# Patient Record
Sex: FEMALE | Race: WHITE | Employment: UNEMPLOYED | URBAN - METROPOLITAN AREA
[De-identification: names, ages, dates, MRNs, and addresses within clinical notes are randomized per-mention and may not be internally consistent; named-entity substitution may affect disease eponyms.]

---

## 2023-03-28 ENCOUNTER — OFFICE VISIT (OUTPATIENT)
Dept: FAMILY MEDICINE CLINIC | Age: 58
End: 2023-03-28
Payer: COMMERCIAL

## 2023-03-28 ENCOUNTER — TELEPHONE (OUTPATIENT)
Dept: CARDIOLOGY CLINIC | Age: 58
End: 2023-03-28

## 2023-03-28 VITALS
HEIGHT: 66 IN | HEART RATE: 88 BPM | BODY MASS INDEX: 43.97 KG/M2 | WEIGHT: 273.6 LBS | RESPIRATION RATE: 16 BRPM | TEMPERATURE: 98.6 F | DIASTOLIC BLOOD PRESSURE: 102 MMHG | SYSTOLIC BLOOD PRESSURE: 172 MMHG

## 2023-03-28 DIAGNOSIS — I10 ESSENTIAL HYPERTENSION: Primary | ICD-10-CM

## 2023-03-28 DIAGNOSIS — I11.9 LVH (LEFT VENTRICULAR HYPERTROPHY) DUE TO HYPERTENSIVE DISEASE, WITHOUT HEART FAILURE: ICD-10-CM

## 2023-03-28 PROCEDURE — 3077F SYST BP >= 140 MM HG: CPT | Performed by: NURSE PRACTITIONER

## 2023-03-28 PROCEDURE — 99204 OFFICE O/P NEW MOD 45 MIN: CPT | Performed by: NURSE PRACTITIONER

## 2023-03-28 PROCEDURE — 93000 ELECTROCARDIOGRAM COMPLETE: CPT | Performed by: NURSE PRACTITIONER

## 2023-03-28 PROCEDURE — 3080F DIAST BP >= 90 MM HG: CPT | Performed by: NURSE PRACTITIONER

## 2023-03-28 RX ORDER — CARVEDILOL 3.12 MG/1
3.12 TABLET ORAL 2 TIMES DAILY
Qty: 60 TABLET | Refills: 0 | Status: SHIPPED | OUTPATIENT
Start: 2023-03-28

## 2023-03-28 RX ORDER — HYDROCHLOROTHIAZIDE 25 MG/1
25 TABLET ORAL DAILY
Qty: 30 TABLET | Refills: 0 | Status: SHIPPED | OUTPATIENT
Start: 2023-03-28

## 2023-03-28 RX ORDER — AMLODIPINE BESYLATE 10 MG/1
10 TABLET ORAL DAILY
Qty: 30 TABLET | Refills: 0 | Status: SHIPPED | OUTPATIENT
Start: 2023-03-28

## 2023-03-28 RX ORDER — LISINOPRIL 20 MG/1
40 TABLET ORAL DAILY
Qty: 60 TABLET | Refills: 0 | Status: SHIPPED | OUTPATIENT
Start: 2023-03-28 | End: 2023-04-27

## 2023-03-28 RX ORDER — HYDROCHLOROTHIAZIDE 25 MG/1
25 TABLET ORAL DAILY
COMMUNITY
Start: 2023-01-27 | End: 2023-03-28 | Stop reason: SDUPTHER

## 2023-03-28 SDOH — ECONOMIC STABILITY: FOOD INSECURITY: WITHIN THE PAST 12 MONTHS, THE FOOD YOU BOUGHT JUST DIDN'T LAST AND YOU DIDN'T HAVE MONEY TO GET MORE.: SOMETIMES TRUE

## 2023-03-28 SDOH — ECONOMIC STABILITY: FOOD INSECURITY: WITHIN THE PAST 12 MONTHS, YOU WORRIED THAT YOUR FOOD WOULD RUN OUT BEFORE YOU GOT MONEY TO BUY MORE.: SOMETIMES TRUE

## 2023-03-28 SDOH — ECONOMIC STABILITY: HOUSING INSECURITY
IN THE LAST 12 MONTHS, WAS THERE A TIME WHEN YOU DID NOT HAVE A STEADY PLACE TO SLEEP OR SLEPT IN A SHELTER (INCLUDING NOW)?: NO

## 2023-03-28 SDOH — ECONOMIC STABILITY: INCOME INSECURITY: HOW HARD IS IT FOR YOU TO PAY FOR THE VERY BASICS LIKE FOOD, HOUSING, MEDICAL CARE, AND HEATING?: SOMEWHAT HARD

## 2023-03-28 ASSESSMENT — PATIENT HEALTH QUESTIONNAIRE - PHQ9
SUM OF ALL RESPONSES TO PHQ QUESTIONS 1-9: 0
1. LITTLE INTEREST OR PLEASURE IN DOING THINGS: 0
SUM OF ALL RESPONSES TO PHQ QUESTIONS 1-9: 0
SUM OF ALL RESPONSES TO PHQ QUESTIONS 1-9: 0
SUM OF ALL RESPONSES TO PHQ9 QUESTIONS 1 & 2: 0
SUM OF ALL RESPONSES TO PHQ QUESTIONS 1-9: 0
2. FEELING DOWN, DEPRESSED OR HOPELESS: 0

## 2023-03-28 ASSESSMENT — ENCOUNTER SYMPTOMS
SHORTNESS OF BREATH: 0
COLOR CHANGE: 0
SORE THROAT: 0
ANAL BLEEDING: 0
RHINORRHEA: 0
BLOOD IN STOOL: 0
DIARRHEA: 0
EYE DISCHARGE: 0
ABDOMINAL PAIN: 0
CONSTIPATION: 0
NAUSEA: 0
ABDOMINAL DISTENTION: 0
COUGH: 0
EYE REDNESS: 0

## 2023-03-28 NOTE — PATIENT INSTRUCTIONS
Recommend ER for cardiac workup   Labs ordered  Stress test ordered  ECHO ordered  Referral to cardiology    Refills sent of blood pressure mediations  Add Lisinopril back in daily    Back in 4 weeks

## 2023-03-28 NOTE — PROGRESS NOTES
Hemoglobin A1C    Hepatitis C Antibody    HIV Screen    Tisha Carmona MD, Cardiology, [address] [place] [address]    Stress test, myoview    EKG 12 Lead    ECHO 2D WO Color Doppler Complete       Medications Prescribed:  Orders Placed This Encounter   Medications    carvedilol (COREG) 3.125 MG tablet     Sig: Take 1 tablet by mouth 2 times daily     Dispense:  60 tablet     Refill:  0    amLODIPine (NORVASC) 10 MG tablet     Sig: Take 1 tablet by mouth daily     Dispense:  30 tablet     Refill:  0    hydroCHLOROthiazide (HYDRODIURIL) 25 MG tablet     Sig: Take 1 tablet by mouth daily     Dispense:  30 tablet     Refill:  0    lisinopril (PRINIVIL) 20 MG tablet     Sig: Take 2 tablets by mouth daily     Dispense:  60 tablet     Refill:  0       Future Appointments   Date Time Provider Adamaris Leblanc   4/27/2023 10:20 AM ADRIANA Hua CNP [facility] - [address] [place] [address]      Patient given educational materials - see patient instructions. Discussed use, benefit, and side effects of prescribedmedications. All patient questions answered. Pt voiced understanding. Reviewed health maintenance. Instructed to continue current medications, diet and exercise. Patient agreed with treatment plan. Follow up as directed.     Electronically signed by ADRIANA Hua CNP on 3/28/2023 at 1:08 PM

## 2023-03-28 NOTE — TELEPHONE ENCOUNTER
New patient referral for Dr Trinidad Hinds. HTN (hypertension)   Essential hypertension   LVH (left ventricular hypertrophy) due to hypertensive disease, without heart failure     Call to patient, unable to LM.

## 2023-03-31 NOTE — TELEPHONE ENCOUNTER
Attempt to reach patient, no answer, voicemail not set up. Encounter routed to PCP office to make aware we have been unable to reach patient to schedule appt.

## 2023-04-04 LAB
ABSOLUTE BASO #: 0.06 K/UL (ref 0–0.2)
ABSOLUTE EOS #: 0.06 K/UL (ref 0–0.5)
ABSOLUTE LYMPH #: 1.95 K/UL (ref 1–4)
ABSOLUTE MONO #: 0.61 K/UL (ref 0.2–1)
ABSOLUTE NEUT #: 2.9 K/UL (ref 1.5–7.5)
BASOPHILS RELATIVE PERCENT: 1.1 %
EOSINOPHILS RELATIVE PERCENT: 1.1 %
HCT VFR BLD CALC: 47.7 % (ref 34–45)
HEMOGLOBIN: 16 G/DL (ref 11.5–15.5)
LYMPHOCYTE %: 34.9 %
MCH RBC QN AUTO: 29.1 PG (ref 25–33)
MCHC RBC AUTO-ENTMCNC: 33.5 G/DL (ref 31–36)
MCV RBC AUTO: 86.7 FL (ref 80–99)
MONOCYTES # BLD: 10.9 %
NEUTROPHILS RELATIVE PERCENT: 51.8 %
PDW BLD-RTO: 12.7 % (ref 11.5–15)
PLATELETS: 285 K/UL (ref 130–400)
PMV BLD AUTO: 9.7 FL (ref 9.3–13)
RBC: 5.5 M/UL (ref 3.8–5.4)
WBC: 5.6 K/UL (ref 3.5–11)

## 2023-04-05 LAB
ALBUMIN SERPL-MCNC: 5.1 G/DL (ref 3.5–5.2)
ALK PHOSPHATASE: 100 U/L (ref 40–136)
ALT SERPL-CCNC: 22 U/L (ref 5–40)
ANION GAP SERPL CALCULATED.3IONS-SCNC: 14 MEQ/L (ref 7–16)
AST SERPL-CCNC: 26 U/L (ref 9–40)
AVERAGE GLUCOSE: 114 MG/DL
BILIRUB SERPL-MCNC: 0.7 MG/DL
BILIRUBIN DIRECT: 0.2 MG/DL (ref 0–0.3)
BUN BLDV-MCNC: 12 MG/DL (ref 6–20)
CALCIUM SERPL-MCNC: 9.3 MG/DL (ref 8.5–10.5)
CHLORIDE BLD-SCNC: 99 MEQ/L (ref 95–107)
CHOLESTEROL/HDL RATIO: 3.5 RATIO
CHOLESTEROL: 190 MG/DL
CO2: 28 MEQ/L (ref 19–31)
CREAT SERPL-MCNC: 0.71 MG/DL (ref 0.6–1.3)
EGFR IF NONAFRICAN AMERICAN: 99 ML/MIN/1.73
GLUCOSE: 113 MG/DL (ref 70–99)
HBA1C MFR BLD: 5.6 % (ref 4.2–5.6)
HDLC SERPL-MCNC: 54 MG/DL
HEPATITIS C ANTIBODY: NORMAL
HIV 1,2 COMBO ANTIGEN/ANTIBODY: NORMAL
LDL CHOLESTEROL CALCULATED: 109 MG/DL
LDL/HDL RATIO: 2 RATIO
POTASSIUM SERPL-SCNC: 4.1 MEQ/L (ref 3.5–5.4)
SODIUM BLD-SCNC: 141 MEQ/L (ref 133–146)
TOTAL PROTEIN: 7.6 G/DL (ref 6.1–8.3)
TRIGL SERPL-MCNC: 134 MG/DL
TSH SERPL DL<=0.05 MIU/L-ACNC: 1.6 UIU/ML (ref 0.4–4.1)
VITAMIN D 25-HYDROXY: 36 NG/ML
VLDLC SERPL CALC-MCNC: 27 MG/DL

## 2023-04-11 ENCOUNTER — TELEPHONE (OUTPATIENT)
Dept: FAMILY MEDICINE CLINIC | Age: 58
End: 2023-04-11

## 2023-04-20 DIAGNOSIS — I10 ESSENTIAL HYPERTENSION: ICD-10-CM

## 2023-04-20 DIAGNOSIS — I11.9 LVH (LEFT VENTRICULAR HYPERTROPHY) DUE TO HYPERTENSIVE DISEASE, WITHOUT HEART FAILURE: ICD-10-CM

## 2023-04-20 RX ORDER — AMLODIPINE BESYLATE 10 MG/1
TABLET ORAL
Qty: 30 TABLET | Refills: 5 | Status: SHIPPED | OUTPATIENT
Start: 2023-04-20

## 2023-04-20 RX ORDER — LISINOPRIL 20 MG/1
TABLET ORAL
Qty: 60 TABLET | Refills: 5 | Status: SHIPPED | OUTPATIENT
Start: 2023-04-20

## 2023-04-20 RX ORDER — CARVEDILOL 3.12 MG/1
TABLET ORAL
Qty: 60 TABLET | Refills: 5 | Status: SHIPPED | OUTPATIENT
Start: 2023-04-20

## 2023-04-20 RX ORDER — HYDROCHLOROTHIAZIDE 25 MG/1
TABLET ORAL
Qty: 30 TABLET | Refills: 5 | Status: SHIPPED | OUTPATIENT
Start: 2023-04-20

## 2023-04-20 NOTE — TELEPHONE ENCOUNTER
Requests sent from [facility] for refills of amlodipine 10 mg qd, carvedilol 3.125 mg bid, lisinopril 20 mg, 2 tabs qd, and hctz 25 mg qd. Last seen 3/28/23, next appt 4/27/23. CMP last done 4/4/23. Medications verified. Orders pended.

## 2023-10-20 DIAGNOSIS — I11.9 LVH (LEFT VENTRICULAR HYPERTROPHY) DUE TO HYPERTENSIVE DISEASE, WITHOUT HEART FAILURE: ICD-10-CM

## 2023-10-20 DIAGNOSIS — I10 ESSENTIAL HYPERTENSION: ICD-10-CM

## 2023-10-20 NOTE — TELEPHONE ENCOUNTER
Requests sent from [facility] for refill of hctz 25 mg qd, lisinopril 20 mg qd, carvedilol 3.125 mg bid, and amlodipine 10 mg qd. Seen as new pt on 3/28/23, no future appt scheduled. Medications verified. Orders pended.

## 2023-10-23 RX ORDER — LISINOPRIL 20 MG/1
TABLET ORAL
Qty: 60 TABLET | Refills: 2 | Status: SHIPPED | OUTPATIENT
Start: 2023-10-23

## 2023-10-23 RX ORDER — AMLODIPINE BESYLATE 10 MG/1
TABLET ORAL
Qty: 30 TABLET | Refills: 2 | Status: SHIPPED | OUTPATIENT
Start: 2023-10-23

## 2023-10-23 RX ORDER — CARVEDILOL 3.12 MG/1
TABLET ORAL
Qty: 60 TABLET | Refills: 2 | Status: SHIPPED | OUTPATIENT
Start: 2023-10-23

## 2023-10-23 RX ORDER — HYDROCHLOROTHIAZIDE 25 MG/1
TABLET ORAL
Qty: 30 TABLET | Refills: 2 | Status: SHIPPED | OUTPATIENT
Start: 2023-10-23

## 2024-01-19 DIAGNOSIS — I11.9 LVH (LEFT VENTRICULAR HYPERTROPHY) DUE TO HYPERTENSIVE DISEASE, WITHOUT HEART FAILURE: ICD-10-CM

## 2024-01-19 DIAGNOSIS — I10 ESSENTIAL HYPERTENSION: ICD-10-CM

## 2024-01-19 NOTE — TELEPHONE ENCOUNTER
This medication refill is regarding a electronic request. Refill requested by  [facility] [address] .    Requested Prescriptions     Pending Prescriptions Disp Refills    amLODIPine (NORVASC) 10 MG tablet [Pharmacy Med Name: AMLODIPINE BESYLATE 10 MG TAB] 30 tablet 0     Sig: take 1 tablet by mouth once daily    carvedilol (COREG) 3.125 MG tablet [Pharmacy Med Name: CARVEDILOL 3.125 MG TABLET] 60 tablet 0     Sig: take 1 tablet by mouth twice a day    lisinopril (PRINIVIL;ZESTRIL) 20 MG tablet [Pharmacy Med Name: LISINOPRIL 20 MG TABLET] 60 tablet 0     Sig: take 2 tablets by mouth once daily    hydroCHLOROthiazide (HYDRODIURIL) 25 MG tablet [Pharmacy Med Name: HYDROCHLOROTHIAZIDE 25 MG TAB] 30 tablet 0     Sig: take 1 tablet by mouth once daily     Date of last visit: 3/28/2023   Date of next visit: None  Date of last refill:    -Amlodipine 10/23/23 #30/2   -Carvedilol 10/23/23 #60/2   -HCTZ 10/23/23 #30/2   -Lisinopril 10/23/23 #60/2    Last CMP:   Lab Results   Component Value Date     04/04/2023    K 4.1 04/04/2023    CL 99 04/04/2023    CO2 28 04/04/2023    BUN 12 04/04/2023    CREATININE 0.71 04/04/2023    GLUCOSE 113 (H) 04/04/2023    CALCIUM 9.3 04/04/2023    PROT 7.6 04/04/2023    LABALBU 5.1 04/04/2023    BILITOT 0.7 04/04/2023    ALKPHOS 100 04/04/2023    AST 26 04/04/2023    ALT 22 04/04/2023    LABGLOM >90 12/06/2011     Rx verified, ordered and set to EP.

## 2024-01-22 RX ORDER — AMLODIPINE BESYLATE 10 MG/1
TABLET ORAL
Qty: 30 TABLET | Refills: 0 | Status: SHIPPED | OUTPATIENT
Start: 2024-01-22

## 2024-01-22 RX ORDER — CARVEDILOL 3.12 MG/1
TABLET ORAL
Qty: 60 TABLET | Refills: 0 | Status: SHIPPED | OUTPATIENT
Start: 2024-01-22

## 2024-01-22 RX ORDER — LISINOPRIL 20 MG/1
TABLET ORAL
Qty: 60 TABLET | Refills: 0 | Status: SHIPPED | OUTPATIENT
Start: 2024-01-22

## 2024-01-22 RX ORDER — HYDROCHLOROTHIAZIDE 25 MG/1
TABLET ORAL
Qty: 30 TABLET | Refills: 0 | Status: SHIPPED | OUTPATIENT
Start: 2024-01-22

## 2024-01-22 NOTE — TELEPHONE ENCOUNTER
1st attempt:     Tried to call pt to let her know she is needing appt, but all circuits rang busy. Will try again at another time.

## 2024-02-18 DIAGNOSIS — I10 ESSENTIAL HYPERTENSION: ICD-10-CM

## 2024-02-18 DIAGNOSIS — I11.9 LVH (LEFT VENTRICULAR HYPERTROPHY) DUE TO HYPERTENSIVE DISEASE, WITHOUT HEART FAILURE: ICD-10-CM

## 2024-02-19 RX ORDER — HYDROCHLOROTHIAZIDE 25 MG/1
TABLET ORAL
Qty: 30 TABLET | Refills: 0 | OUTPATIENT
Start: 2024-02-19

## 2024-02-19 RX ORDER — CARVEDILOL 3.12 MG/1
TABLET ORAL
Qty: 60 TABLET | Refills: 0 | OUTPATIENT
Start: 2024-02-19

## 2024-02-19 RX ORDER — LISINOPRIL 20 MG/1
TABLET ORAL
Qty: 60 TABLET | Refills: 0 | OUTPATIENT
Start: 2024-02-19

## 2024-02-19 RX ORDER — AMLODIPINE BESYLATE 10 MG/1
TABLET ORAL
Qty: 30 TABLET | Refills: 0 | OUTPATIENT
Start: 2024-02-19

## 2024-02-19 NOTE — TELEPHONE ENCOUNTER
This medication refill is regarding a electronic request. Refill requested by  pharmacy .    Requested Prescriptions     Pending Prescriptions Disp Refills    amLODIPine (NORVASC) 10 MG tablet [Pharmacy Med Name: AMLODIPINE BESYLATE 10 MG TAB] 30 tablet 0     Sig: take 1 tablet by mouth once daily    carvedilol (COREG) 3.125 MG tablet [Pharmacy Med Name: CARVEDILOL 3.125 MG TABLET] 60 tablet 0     Sig: take 1 tablet by mouth twice a day    lisinopril (PRINIVIL;ZESTRIL) 20 MG tablet [Pharmacy Med Name: LISINOPRIL 20 MG TABLET] 60 tablet 0     Sig: take 2 tablets by mouth once daily    hydroCHLOROthiazide (HYDRODIURIL) 25 MG tablet [Pharmacy Med Name: HYDROCHLOROTHIAZIDE 25 MG TAB] 30 tablet 0     Sig: take 1 tablet by mouth once daily       Date of last visit: 3/28/2023   Date of next visit: Visit date not found  Date of last refill: 1/22/24  Pharmacy Name:     Last Lipid Panel:    Lab Results   Component Value Date/Time    CHOL 190 04/04/2023 11:45 AM    CHOL 200 12/06/2011 10:07 AM    TRIG 134 04/04/2023 11:45 AM    HDL 54 04/04/2023 11:45 AM    LDLCALC 109 04/04/2023 11:45 AM     Last CMP:   Lab Results   Component Value Date     04/04/2023    K 4.1 04/04/2023    CL 99 04/04/2023    CO2 28 04/04/2023    BUN 12 04/04/2023    CREATININE 0.71 04/04/2023    GLUCOSE 113 (H) 04/04/2023    CALCIUM 9.3 04/04/2023    PROT 7.6 04/04/2023    LABALBU 5.1 04/04/2023    BILITOT 0.7 04/04/2023    ALKPHOS 100 04/04/2023    AST 26 04/04/2023    ALT 22 04/04/2023    LABGLOM >90 12/06/2011       Last Thyroid:    Lab Results   Component Value Date    TSH 1.60 04/04/2023     Last Hemoglobin A1C:    Lab Results   Component Value Date/Time    LABA1C 5.6 04/04/2023 11:45 AM       Rx verified, ordered and set to EP.

## 2024-03-18 ENCOUNTER — OFFICE VISIT (OUTPATIENT)
Dept: FAMILY MEDICINE CLINIC | Age: 59
End: 2024-03-18
Payer: COMMERCIAL

## 2024-03-18 ENCOUNTER — TELEPHONE (OUTPATIENT)
Dept: CARDIOLOGY CLINIC | Age: 59
End: 2024-03-18

## 2024-03-18 VITALS
BODY MASS INDEX: 46.48 KG/M2 | WEIGHT: 288 LBS | SYSTOLIC BLOOD PRESSURE: 170 MMHG | HEART RATE: 88 BPM | DIASTOLIC BLOOD PRESSURE: 98 MMHG | RESPIRATION RATE: 16 BRPM

## 2024-03-18 DIAGNOSIS — I10 ESSENTIAL HYPERTENSION: Primary | ICD-10-CM

## 2024-03-18 DIAGNOSIS — Z00.00 ENCOUNTER FOR WELL ADULT EXAM WITHOUT ABNORMAL FINDINGS: ICD-10-CM

## 2024-03-18 DIAGNOSIS — Z12.11 COLON CANCER SCREENING: ICD-10-CM

## 2024-03-18 DIAGNOSIS — I11.9 LVH (LEFT VENTRICULAR HYPERTROPHY) DUE TO HYPERTENSIVE DISEASE, WITHOUT HEART FAILURE: ICD-10-CM

## 2024-03-18 DIAGNOSIS — Z71.89 ACP (ADVANCE CARE PLANNING): ICD-10-CM

## 2024-03-18 PROCEDURE — 3077F SYST BP >= 140 MM HG: CPT | Performed by: NURSE PRACTITIONER

## 2024-03-18 PROCEDURE — 99396 PREV VISIT EST AGE 40-64: CPT | Performed by: NURSE PRACTITIONER

## 2024-03-18 PROCEDURE — 3080F DIAST BP >= 90 MM HG: CPT | Performed by: NURSE PRACTITIONER

## 2024-03-18 RX ORDER — LISINOPRIL 20 MG/1
40 TABLET ORAL DAILY
Qty: 60 TABLET | Refills: 0 | Status: SHIPPED | OUTPATIENT
Start: 2024-03-18

## 2024-03-18 RX ORDER — CARVEDILOL 3.12 MG/1
3.12 TABLET ORAL 2 TIMES DAILY
Qty: 60 TABLET | Refills: 0 | Status: SHIPPED | OUTPATIENT
Start: 2024-03-18

## 2024-03-18 RX ORDER — HYDROCHLOROTHIAZIDE 25 MG/1
25 TABLET ORAL DAILY
Qty: 30 TABLET | Refills: 0 | Status: SHIPPED | OUTPATIENT
Start: 2024-03-18

## 2024-03-18 RX ORDER — AMLODIPINE BESYLATE 10 MG/1
10 TABLET ORAL DAILY
Qty: 30 TABLET | Refills: 0 | Status: SHIPPED | OUTPATIENT
Start: 2024-03-18

## 2024-03-18 ASSESSMENT — ENCOUNTER SYMPTOMS
DIARRHEA: 0
SORE THROAT: 0
NAUSEA: 0
BLOOD IN STOOL: 0
COUGH: 0
SHORTNESS OF BREATH: 0
ABDOMINAL PAIN: 0
RHINORRHEA: 0
ANAL BLEEDING: 0
COLOR CHANGE: 0
ABDOMINAL DISTENTION: 0
EYE REDNESS: 0
EYE DISCHARGE: 0

## 2024-03-18 NOTE — PATIENT INSTRUCTIONS
Incorporated.   Care instructions adapted under license by [facility]. If you have questions about a medical condition or this instruction, always ask your healthcare professional. [employer] disclaims any warranty or liability for your use of this information.

## 2024-03-18 NOTE — TELEPHONE ENCOUNTER
LM for pt to return call.    Pt is needing a NP appt.  Diagnosis   I10 (ICD-10-CM) - Essential hypertension   I11.9 (ICD-10-CM) - LVH (left ventricular hypertrophy) due to hypertensive disease, without heart failure

## 2024-03-18 NOTE — PROGRESS NOTES
Well Adult Note  Name: Kala Gomez Today’s Date: 3/18/2024   MRN: [id number] Sex: Female   Age: 58 y.o. Ethnicity: Non- / Non    : 1965 Race: White (non-)      Kala Gomez is here for well adult exam.  History:    Has not been in office in a year, since new pt appointment.   At that time she was referred to cardiology for abnormal EKG and elevated blood pressure.  We have also ordered a stress test and echo.  None of which was completed.  Cardiology department attempted to contact her multiple times with no answer or return call.    Came in today for refill of her blood pressure medication, states she has been out \"for a few days\".    Denies chest pain or shortness of breath today.    Wellness visit:    Here today for an annual wellness exam. DIET: eats 3 meals per day and 2 snacks per day.  She does exercise regularly. Physical activities include: walking. She does take over the counter vitamins or supplements.  The patient has ever had a blood transfusion No. Any tattoos No ?    She wears her seatbelts while riding a car. She performs all of her ADL's without problem.  She is independent, she cooks, drives, bathes, and gets dressed without assistance. She is a . She has 4 children.  She does not work.     She is not a smoker. Smokes 0 packs per day.  Patient 0 consume alcoholic beverages on a regular basis.      Never had colonoscopy - refuses.  Her last mammogram - NEVER - refuses.  Her last pap smear was  and it was normal - refuses referral/repeat testing.     She  reports that she has never smoked. She has never used smokeless tobacco. She reports that she does not drink alcohol and does not use drugs..       Review of Systems   Constitutional:  Negative for chills, fatigue and fever.   HENT:  Negative for congestion, ear pain, postnasal drip, rhinorrhea and sore throat.    Eyes:  Negative for discharge and redness.   Respiratory:  Negative for cough and shortness of

## 2024-03-21 NOTE — TELEPHONE ENCOUNTER
Please call patient regarding cardiology referral - provide her with the phone number to schedule, they have attempted to contact her multiple times

## 2024-04-09 DIAGNOSIS — I10 ESSENTIAL HYPERTENSION: ICD-10-CM

## 2024-04-09 DIAGNOSIS — I11.9 LVH (LEFT VENTRICULAR HYPERTROPHY) DUE TO HYPERTENSIVE DISEASE, WITHOUT HEART FAILURE: ICD-10-CM

## 2024-04-09 RX ORDER — LISINOPRIL 20 MG/1
40 TABLET ORAL DAILY
Qty: 60 TABLET | Refills: 0 | Status: SHIPPED | OUTPATIENT
Start: 2024-04-09

## 2024-04-09 RX ORDER — AMLODIPINE BESYLATE 10 MG/1
10 TABLET ORAL DAILY
Qty: 30 TABLET | Refills: 0 | Status: SHIPPED | OUTPATIENT
Start: 2024-04-09

## 2024-04-09 RX ORDER — CARVEDILOL 3.12 MG/1
3.12 TABLET ORAL 2 TIMES DAILY
Qty: 60 TABLET | Refills: 0 | Status: SHIPPED | OUTPATIENT
Start: 2024-04-09

## 2024-04-09 RX ORDER — HYDROCHLOROTHIAZIDE 25 MG/1
25 TABLET ORAL DAILY
Qty: 30 TABLET | Refills: 0 | Status: SHIPPED | OUTPATIENT
Start: 2024-04-09

## 2024-04-09 NOTE — TELEPHONE ENCOUNTER
This medication refill is regarding a electronic request. Refill requested by  PHARMACY .    Requested Prescriptions     Pending Prescriptions Disp Refills    amLODIPine (NORVASC) 10 MG tablet [Pharmacy Med Name: AMLODIPINE BESYLATE 10 MG TAB] 30 tablet 0     Sig: take 1 tablet by mouth once daily    carvedilol (COREG) 3.125 MG tablet [Pharmacy Med Name: CARVEDILOL 3.125 MG TABLET] 60 tablet 0     Sig: take 1 tablet by mouth twice a day    lisinopril (PRINIVIL;ZESTRIL) 20 MG tablet [Pharmacy Med Name: LISINOPRIL 20 MG TABLET] 60 tablet 0     Sig: take 2 tablets by mouth once daily    hydroCHLOROthiazide (HYDRODIURIL) 25 MG tablet [Pharmacy Med Name: HYDROCHLOROTHIAZIDE 25 MG TAB] 30 tablet 0     Sig: take 1 tablet by mouth once daily       Date of last visit: 3/18/2024   Date of next visit: 4/15/2024  Date of last refill: 3/18/24  Pharmacy Name:     Last Lipid Panel:    Lab Results   Component Value Date/Time    CHOL 190 04/04/2023 11:45 AM    CHOL 200 12/06/2011 10:07 AM    TRIG 134 04/04/2023 11:45 AM    HDL 54 04/04/2023 11:45 AM    LDLCALC 109 04/04/2023 11:45 AM     Last CMP:   Lab Results   Component Value Date     04/04/2023    K 4.1 04/04/2023    CL 99 04/04/2023    CO2 28 04/04/2023    BUN 12 04/04/2023    CREATININE 0.71 04/04/2023    GLUCOSE 113 (H) 04/04/2023    CALCIUM 9.3 04/04/2023    PROT 7.6 04/04/2023    LABALBU 5.1 04/04/2023    BILITOT 0.7 04/04/2023    ALKPHOS 100 04/04/2023    AST 26 04/04/2023    ALT 22 04/04/2023    LABGLOM >90 12/06/2011       Last Thyroid:    Lab Results   Component Value Date    TSH 1.60 04/04/2023     Last Hemoglobin A1C:    Lab Results   Component Value Date/Time    LABA1C 5.6 04/04/2023 11:45 AM       Rx verified, ordered and set to EP.

## 2024-04-11 LAB — NONINV COLON CA DNA+OCC BLD SCRN STL QL: NORMAL

## 2024-04-12 ENCOUNTER — TELEPHONE (OUTPATIENT)
Dept: FAMILY MEDICINE CLINIC | Age: 59
End: 2024-04-12

## 2024-04-12 LAB
ABSOLUTE BASO #: 0.05 K/UL (ref 0–0.2)
ABSOLUTE EOS #: 0.04 K/UL (ref 0–0.5)
ABSOLUTE LYMPH #: 1.29 K/UL (ref 1–4)
ABSOLUTE MONO #: 0.52 K/UL (ref 0.2–1)
ABSOLUTE NEUT #: 3.32 K/UL (ref 1.5–7.5)
ALBUMIN SERPL-MCNC: 4.3 G/DL (ref 3.5–5.2)
ALK PHOSPHATASE: 100 U/L (ref 40–136)
ALT SERPL-CCNC: 19 U/L (ref 5–40)
ANION GAP SERPL CALCULATED.3IONS-SCNC: 8 MEQ/L (ref 7–16)
AST SERPL-CCNC: 20 U/L (ref 9–40)
BASOPHILS RELATIVE PERCENT: 1 %
BILIRUB SERPL-MCNC: 0.8 MG/DL
BUN BLDV-MCNC: 10 MG/DL (ref 6–20)
CALCIUM SERPL-MCNC: 9.4 MG/DL (ref 8.5–10.5)
CHLORIDE BLD-SCNC: 101 MEQ/L (ref 95–107)
CHOLESTEROL/HDL RATIO: 4.9 RATIO
CHOLESTEROL: 210 MG/DL
CO2: 30 MEQ/L (ref 19–31)
CREAT SERPL-MCNC: 0.75 MG/DL (ref 0.6–1.3)
EGFR IF NONAFRICAN AMERICAN: 92 ML/MIN/1.73
EOSINOPHILS RELATIVE PERCENT: 0.8 %
GLUCOSE: 97 MG/DL (ref 70–99)
HCT VFR BLD CALC: 45.3 % (ref 34–45)
HDLC SERPL-MCNC: 43 MG/DL
HEMOGLOBIN: 14.6 G/DL (ref 11.5–15.5)
IMMATURE GRANULOCYTES %: 0.2 %
LDL CHOLESTEROL CALCULATED: 134 MG/DL
LDL/HDL RATIO: 3.1 RATIO
LYMPHOCYTE %: 24.7 %
MCH RBC QN AUTO: 28.7 PG (ref 25–33)
MCHC RBC AUTO-ENTMCNC: 32.2 G/DL (ref 31–36)
MCV RBC AUTO: 89 FL (ref 80–99)
MONOCYTES # BLD: 9.9 %
NEUTROPHILS RELATIVE PERCENT: 63.4 %
PDW BLD-RTO: 12.6 % (ref 11.5–15)
PLATELETS: 268 K/UL (ref 130–400)
PMV BLD AUTO: 9.7 FL (ref 9.3–13)
POTASSIUM SERPL-SCNC: 4.2 MEQ/L (ref 3.5–5.4)
RBC: 5.09 M/UL (ref 3.8–5.4)
SODIUM BLD-SCNC: 139 MEQ/L (ref 133–146)
TOTAL PROTEIN: 6.7 G/DL (ref 6.1–8.3)
TRIGL SERPL-MCNC: 164 MG/DL
TSH SERPL DL<=0.05 MIU/L-ACNC: 1.53 UIU/ML (ref 0.4–4.1)
VITAMIN D 25-HYDROXY: 31 NG/ML
VLDLC SERPL CALC-MCNC: 33 MG/DL
WBC: 5.2 K/UL (ref 3.5–11)

## 2024-04-12 NOTE — TELEPHONE ENCOUNTER
----- Message from ADRIANA Staples - CNP sent at 4/12/2024 10:15 AM EDT -----  Cholesterol is elevated.  Recommend starting Lipitor 20 mg.  1 tablet daily.  Dispense 30 with 5 refills.  Recheck lipid panel in 3 months with a 12-hour fast.  DX mixed hyperlipidemia    Vitamin D is low at 31.  Recommend starting 5000 IUs of vitamin D3, take 1 capsule daily.  Can get this over-the-counter    Please ensure patient has been taking her blood pressure medication since her last visit and encouraged her to keep her follow-up on the 15th.

## 2024-04-15 ENCOUNTER — OFFICE VISIT (OUTPATIENT)
Dept: FAMILY MEDICINE CLINIC | Age: 59
End: 2024-04-15
Payer: COMMERCIAL

## 2024-04-15 VITALS
SYSTOLIC BLOOD PRESSURE: 162 MMHG | WEIGHT: 290 LBS | HEART RATE: 92 BPM | RESPIRATION RATE: 16 BRPM | DIASTOLIC BLOOD PRESSURE: 98 MMHG | BODY MASS INDEX: 46.61 KG/M2 | HEIGHT: 66 IN

## 2024-04-15 DIAGNOSIS — E78.2 MIXED HYPERLIPIDEMIA: Primary | ICD-10-CM

## 2024-04-15 DIAGNOSIS — I10 ESSENTIAL HYPERTENSION: ICD-10-CM

## 2024-04-15 DIAGNOSIS — I11.9 LVH (LEFT VENTRICULAR HYPERTROPHY) DUE TO HYPERTENSIVE DISEASE, WITHOUT HEART FAILURE: ICD-10-CM

## 2024-04-15 PROCEDURE — 99214 OFFICE O/P EST MOD 30 MIN: CPT | Performed by: NURSE PRACTITIONER

## 2024-04-15 PROCEDURE — 3077F SYST BP >= 140 MM HG: CPT | Performed by: NURSE PRACTITIONER

## 2024-04-15 PROCEDURE — 3080F DIAST BP >= 90 MM HG: CPT | Performed by: NURSE PRACTITIONER

## 2024-04-15 RX ORDER — ATORVASTATIN CALCIUM 20 MG/1
20 TABLET, FILM COATED ORAL DAILY
Qty: 30 TABLET | Refills: 3 | Status: SHIPPED | OUTPATIENT
Start: 2024-04-15

## 2024-04-15 RX ORDER — ASPIRIN 81 MG/1
81 TABLET ORAL DAILY
Qty: 90 TABLET | Refills: 0 | Status: SHIPPED | OUTPATIENT
Start: 2024-04-15

## 2024-04-15 RX ORDER — CARVEDILOL 6.25 MG/1
6.25 TABLET ORAL 2 TIMES DAILY
Qty: 60 TABLET | Refills: 0 | Status: SHIPPED | OUTPATIENT
Start: 2024-04-15

## 2024-04-15 ASSESSMENT — ENCOUNTER SYMPTOMS
EYE DISCHARGE: 0
BLOOD IN STOOL: 0
NAUSEA: 0
ANAL BLEEDING: 0
RHINORRHEA: 0
ABDOMINAL PAIN: 0
COUGH: 0
EYE REDNESS: 0
COLOR CHANGE: 0
ABDOMINAL DISTENTION: 0
SHORTNESS OF BREATH: 0
CONSTIPATION: 0
DIARRHEA: 0
SORE THROAT: 0

## 2024-04-15 ASSESSMENT — PATIENT HEALTH QUESTIONNAIRE - PHQ9
SUM OF ALL RESPONSES TO PHQ QUESTIONS 1-9: 0
2. FEELING DOWN, DEPRESSED OR HOPELESS: NOT AT ALL
SUM OF ALL RESPONSES TO PHQ QUESTIONS 1-9: 0
1. LITTLE INTEREST OR PLEASURE IN DOING THINGS: NOT AT ALL
SUM OF ALL RESPONSES TO PHQ QUESTIONS 1-9: 0
SUM OF ALL RESPONSES TO PHQ QUESTIONS 1-9: 0
SUM OF ALL RESPONSES TO PHQ9 QUESTIONS 1 & 2: 0

## 2024-04-15 NOTE — PROGRESS NOTES
[facility]  [facility] MEDICINE  [address]  [facility] [address]  Dept: [phone]  Loc: [phone]    Visit Date: 4/15/2024    Kala Gomez is a 58 y.o. female who presents today for:  Chief Complaint   Patient presents with    1 Month Follow-Up     HPI:     Never saw cardiologist -has been referred twice.  Cardiology attempted to reach patient for scheduling, no answer.  Ordered a stress test and echo for patient, was never completed.  Patient reports that she checks her blood pressure occasionally when she goes to [employer].  Blood pressure is always high, 180s over 90s to 100s    Colon Cancer - Cologuard:  FIT-DNA (Cologuard)  N/A Sample Could Not Be Processed 2   Comment: Addition of stabilization buffer to the specimen could not be verified. The patient will be contacted to initiate a new sample collection.     Recent labs reviewed - cholesterol elevated   HPI  Health Maintenance   Topic Date Due    COVID-19 Vaccine (1) Never done    DTaP/Tdap/Td vaccine (1 - Tdap) Never done    Colorectal Cancer Screen  Never done    Breast cancer screen  05/05/2017    Depression Screen  03/28/2024    Shingles vaccine (1 of 2) 03/18/2025 (Originally 9/19/2015)    Cervical cancer screen  03/18/2025 (Originally 9/19/1986)    Lipids  04/11/2029    Hepatitis B vaccine  Completed    Flu vaccine  Completed    Hepatitis C screen  Completed    HIV screen  Completed    Hepatitis A vaccine  Aged Out    Hib vaccine  Aged Out    Polio vaccine  Aged Out    Meningococcal (ACWY) vaccine  Aged Out    Pneumococcal 0-64 years Vaccine  Aged Out    Diabetes screen  Discontinued     History reviewed. No pertinent past medical history.   History reviewed. No pertinent surgical history.  Family History   Problem Relation Age of Onset    Arthritis Mother     High Blood Pressure Mother     High Blood Pressure Father      Social History     Tobacco Use    Smoking status: Never    Smokeless tobacco: Never

## 2024-05-08 ENCOUNTER — HOSPITAL ENCOUNTER (OUTPATIENT)
Age: 59
Discharge: HOME | End: 2024-05-10
Payer: COMMERCIAL

## 2024-05-08 VITALS
BODY MASS INDEX: 46.61 KG/M2 | SYSTOLIC BLOOD PRESSURE: 162 MMHG | WEIGHT: 290 LBS | HEIGHT: 66 IN | DIASTOLIC BLOOD PRESSURE: 98 MMHG

## 2024-05-08 VITALS — SYSTOLIC BLOOD PRESSURE: 239 MMHG | RESPIRATION RATE: 21 BRPM | HEART RATE: 96 BPM | DIASTOLIC BLOOD PRESSURE: 121 MMHG

## 2024-05-08 DIAGNOSIS — I11.9 LVH (LEFT VENTRICULAR HYPERTROPHY) DUE TO HYPERTENSIVE DISEASE, WITHOUT HEART FAILURE: ICD-10-CM

## 2024-05-08 DIAGNOSIS — I10 ESSENTIAL HYPERTENSION: ICD-10-CM

## 2024-05-08 LAB
ECHO AO ASC DIAM: 3.8 CM
ECHO AO ASCENDING AORTA INDEX: 1.62 CM/M2
ECHO AV CUSP MM: 1.9 CM
ECHO AV PEAK GRADIENT: 9 MMHG
ECHO AV PEAK VELOCITY: 1.5 M/S
ECHO AV VELOCITY RATIO: 0.67
ECHO BSA: 2.47 M2
ECHO EST RA PRESSURE: 5 MMHG
ECHO LA AREA 2C: 18.5 CM2
ECHO LA AREA 4C: 17.8 CM2
ECHO LA DIAMETER INDEX: 1.79 CM/M2
ECHO LA DIAMETER: 4.2 CM
ECHO LA MAJOR AXIS: 5.4 CM
ECHO LA MINOR AXIS: 5.8 CM
ECHO LA VOL BP: 50 ML (ref 22–52)
ECHO LA VOL MOD A2C: 49 ML (ref 22–52)
ECHO LA VOL MOD A4C: 47 ML (ref 22–52)
ECHO LA VOL/BSA BIPLANE: 21 ML/M2 (ref 16–34)
ECHO LA VOLUME INDEX MOD A2C: 21 ML/M2 (ref 16–34)
ECHO LA VOLUME INDEX MOD A4C: 20 ML/M2 (ref 16–34)
ECHO LV E' LATERAL VELOCITY: 7 CM/S
ECHO LV E' SEPTAL VELOCITY: 7 CM/S
ECHO LV EDV A2C: 122 ML
ECHO LV EDV A4C: 136 ML
ECHO LV EDV INDEX A4C: 58 ML/M2
ECHO LV EDV NDEX A2C: 52 ML/M2
ECHO LV EJECTION FRACTION A2C: 41 %
ECHO LV EJECTION FRACTION A4C: 41 %
ECHO LV EJECTION FRACTION BIPLANE: 41 % (ref 55–100)
ECHO LV ESV A2C: 73 ML
ECHO LV ESV A4C: 81 ML
ECHO LV ESV INDEX A2C: 31 ML/M2
ECHO LV ESV INDEX A4C: 35 ML/M2
ECHO LV FRACTIONAL SHORTENING: 20 % (ref 28–44)
ECHO LV INTERNAL DIMENSION DIASTOLE INDEX: 2.35 CM/M2
ECHO LV INTERNAL DIMENSION DIASTOLIC: 5.5 CM (ref 3.9–5.3)
ECHO LV INTERNAL DIMENSION SYSTOLIC INDEX: 1.88 CM/M2
ECHO LV INTERNAL DIMENSION SYSTOLIC: 4.4 CM
ECHO LV ISOVOLUMETRIC RELAXATION TIME (IVRT): 98 MS
ECHO LV IVSD: 1.1 CM (ref 0.6–0.9)
ECHO LV MASS 2D: 242 G (ref 67–162)
ECHO LV MASS INDEX 2D: 103.4 G/M2 (ref 43–95)
ECHO LV POSTERIOR WALL DIASTOLIC: 1.1 CM (ref 0.6–0.9)
ECHO LV RELATIVE WALL THICKNESS RATIO: 0.4
ECHO LVOT PEAK GRADIENT: 4 MMHG
ECHO LVOT PEAK VELOCITY: 1 M/S
ECHO MV A VELOCITY: 1.02 M/S
ECHO MV E DECELERATION TIME (DT): 83 MS
ECHO MV E VELOCITY: 0.78 M/S
ECHO MV E/A RATIO: 0.76
ECHO MV E/E' LATERAL: 11.14
ECHO MV E/E' RATIO (AVERAGED): 11.14
ECHO MV REGURGITANT PEAK GRADIENT: 159 MMHG
ECHO MV REGURGITANT PEAK VELOCITY: 6.3 M/S
ECHO PV MAX VELOCITY: 0.8 M/S
ECHO PV PEAK GRADIENT: 2 MMHG
ECHO RV INTERNAL DIMENSION: 2.9 CM
ECHO RV TAPSE: 1.9 CM (ref 1.7–?)
ECHO TV E WAVE: 0.6 M/S

## 2024-05-08 PROCEDURE — 93306 TTE W/DOPPLER COMPLETE: CPT | Performed by: INTERNAL MEDICINE

## 2024-05-08 PROCEDURE — 93306 TTE W/DOPPLER COMPLETE: CPT

## 2024-05-08 PROCEDURE — 93017 CV STRESS TEST TRACING ONLY: CPT

## 2024-05-08 NOTE — PROGRESS NOTES
1005: Patient received from cardiac ECHO lab for scheduled exercise stress test. Two patient identifiers verified.     1010: Patient educated about procedure, consent signed.     1015: Patient hooked up to continuous 12 lead EKG for stress test. Vitals taken. Patient with severely elevated blood pressure. Patient denies any symptoms. Blood pressure retaken multiple times to ensure accuracy.     1030: Dr. Porter called and updated on patient status. Instructed this RN to cancel procedure due to severe hypertension and to bring patient to emergency room.     1032: Patient updated on phone call. Instructed patient that the Cardiologist wishes for her to be seen in the emergency room. Patient not agreeable to be taken to emergency room, wishes to go home. Patient educated on the risks of leaving the hospital with severely elevated blood pressure. Patient still addiment on going home. Patient taken by wheelchair to hospital exit doors where she walked to her vehicle.

## 2024-05-11 DIAGNOSIS — I11.9 LVH (LEFT VENTRICULAR HYPERTROPHY) DUE TO HYPERTENSIVE DISEASE, WITHOUT HEART FAILURE: ICD-10-CM

## 2024-05-11 DIAGNOSIS — I10 ESSENTIAL HYPERTENSION: ICD-10-CM

## 2024-05-13 ENCOUNTER — TELEPHONE (OUTPATIENT)
Dept: FAMILY MEDICINE CLINIC | Age: 59
End: 2024-05-13

## 2024-05-13 ENCOUNTER — OFFICE VISIT (OUTPATIENT)
Dept: FAMILY MEDICINE CLINIC | Age: 59
End: 2024-05-13
Payer: COMMERCIAL

## 2024-05-13 VITALS
BODY MASS INDEX: 46.77 KG/M2 | RESPIRATION RATE: 16 BRPM | HEART RATE: 72 BPM | TEMPERATURE: 97.8 F | WEIGHT: 289.8 LBS | SYSTOLIC BLOOD PRESSURE: 194 MMHG | DIASTOLIC BLOOD PRESSURE: 110 MMHG

## 2024-05-13 DIAGNOSIS — I10 ESSENTIAL HYPERTENSION: ICD-10-CM

## 2024-05-13 DIAGNOSIS — I11.9 LVH (LEFT VENTRICULAR HYPERTROPHY) DUE TO HYPERTENSIVE DISEASE, WITHOUT HEART FAILURE: ICD-10-CM

## 2024-05-13 DIAGNOSIS — E78.2 MIXED HYPERLIPIDEMIA: ICD-10-CM

## 2024-05-13 PROCEDURE — 3080F DIAST BP >= 90 MM HG: CPT | Performed by: NURSE PRACTITIONER

## 2024-05-13 PROCEDURE — 3077F SYST BP >= 140 MM HG: CPT | Performed by: NURSE PRACTITIONER

## 2024-05-13 PROCEDURE — 99214 OFFICE O/P EST MOD 30 MIN: CPT | Performed by: NURSE PRACTITIONER

## 2024-05-13 RX ORDER — CARVEDILOL 6.25 MG/1
6.25 TABLET ORAL 2 TIMES DAILY
Qty: 60 TABLET | Refills: 0 | OUTPATIENT
Start: 2024-05-13

## 2024-05-13 RX ORDER — CARVEDILOL 6.25 MG/1
6.25 TABLET ORAL 2 TIMES DAILY
Qty: 60 TABLET | Refills: 0 | Status: SHIPPED | OUTPATIENT
Start: 2024-05-13

## 2024-05-13 RX ORDER — AMLODIPINE BESYLATE 10 MG/1
10 TABLET ORAL DAILY
Qty: 30 TABLET | Refills: 1 | Status: SHIPPED | OUTPATIENT
Start: 2024-05-13

## 2024-05-13 RX ORDER — LISINOPRIL 20 MG/1
40 TABLET ORAL DAILY
Qty: 60 TABLET | Refills: 0 | Status: SHIPPED | OUTPATIENT
Start: 2024-05-13

## 2024-05-13 RX ORDER — HYDROCHLOROTHIAZIDE 25 MG/1
25 TABLET ORAL DAILY
Qty: 30 TABLET | Refills: 0 | Status: SHIPPED | OUTPATIENT
Start: 2024-05-13

## 2024-05-13 RX ORDER — ATORVASTATIN CALCIUM 20 MG/1
20 TABLET, FILM COATED ORAL DAILY
Qty: 30 TABLET | Refills: 0 | Status: SHIPPED | OUTPATIENT
Start: 2024-05-13

## 2024-05-13 RX ORDER — ASPIRIN 81 MG/1
81 TABLET ORAL DAILY
Qty: 90 TABLET | Refills: 0 | Status: SHIPPED | OUTPATIENT
Start: 2024-05-13

## 2024-05-13 SDOH — ECONOMIC STABILITY: FOOD INSECURITY: WITHIN THE PAST 12 MONTHS, THE FOOD YOU BOUGHT JUST DIDN'T LAST AND YOU DIDN'T HAVE MONEY TO GET MORE.: NEVER TRUE

## 2024-05-13 SDOH — ECONOMIC STABILITY: INCOME INSECURITY: HOW HARD IS IT FOR YOU TO PAY FOR THE VERY BASICS LIKE FOOD, HOUSING, MEDICAL CARE, AND HEATING?: NOT HARD AT ALL

## 2024-05-13 SDOH — ECONOMIC STABILITY: FOOD INSECURITY: WITHIN THE PAST 12 MONTHS, YOU WORRIED THAT YOUR FOOD WOULD RUN OUT BEFORE YOU GOT MONEY TO BUY MORE.: NEVER TRUE

## 2024-05-13 ASSESSMENT — ENCOUNTER SYMPTOMS
SHORTNESS OF BREATH: 0
SORE THROAT: 0
ABDOMINAL DISTENTION: 0
COLOR CHANGE: 0
COUGH: 0
NAUSEA: 0
RHINORRHEA: 0
BLOOD IN STOOL: 0
CONSTIPATION: 0
EYE DISCHARGE: 0

## 2024-05-13 NOTE — TELEPHONE ENCOUNTER
Kala stopped in stating Dr. Terry see's both her daughter and grand daughter and would like to become a new patient with Dr. Terry herself?

## 2024-05-13 NOTE — TELEPHONE ENCOUNTER
I called Kala to schedule her as a new patient with Dr. Terry, no answer left a detailed VM requesting Kala to call the office back and schedule!

## 2024-05-13 NOTE — TELEPHONE ENCOUNTER
----- Message from ADRIANA Staples - CNP sent at 5/8/2024  3:55 PM EDT -----  ECHO is abnormal - try to move up Cardiology appointment please.    Call patient regarding recent blood pressure - scheduled for stress test today and bp was 239/121 - they advised she go to ER but she declined. Please call patient and advise again to go to the ER for blood pressure. High risk of heart attack/stroke.

## 2024-05-13 NOTE — TELEPHONE ENCOUNTER
Called pt 5/9/52 and left msg to call office back, she is in office 5/13/24 and discussed results at appointment.

## 2024-05-13 NOTE — TELEPHONE ENCOUNTER
[facility] carvedilol 6.25 mg bid.  Last seen 4/15/24, being seen again today.  Will have provider address at [facility].

## 2024-05-13 NOTE — PROGRESS NOTES
questions answered.  Pt voiced understanding. Reviewed health maintenance.  Instructed to continue current medications, diet and exercise.  Patient agreed with treatment plan. Follow up as directed.    Electronically signed by ADRIANA Staples CNP on 5/13/2024 at 1:16 PM

## 2024-05-28 ENCOUNTER — OFFICE VISIT (OUTPATIENT)
Dept: FAMILY MEDICINE CLINIC | Age: 59
End: 2024-05-28
Payer: COMMERCIAL

## 2024-05-28 VITALS
RESPIRATION RATE: 14 BRPM | OXYGEN SATURATION: 99 % | TEMPERATURE: 98.6 F | DIASTOLIC BLOOD PRESSURE: 92 MMHG | BODY MASS INDEX: 46.09 KG/M2 | HEIGHT: 66 IN | SYSTOLIC BLOOD PRESSURE: 142 MMHG | WEIGHT: 286.8 LBS | HEART RATE: 85 BPM

## 2024-05-28 DIAGNOSIS — I10 PRIMARY HYPERTENSION: Primary | ICD-10-CM

## 2024-05-28 DIAGNOSIS — Z12.31 ENCOUNTER FOR SCREENING MAMMOGRAM FOR MALIGNANT NEOPLASM OF BREAST: ICD-10-CM

## 2024-05-28 DIAGNOSIS — E78.49 OTHER HYPERLIPIDEMIA: ICD-10-CM

## 2024-05-28 PROCEDURE — 3077F SYST BP >= 140 MM HG: CPT | Performed by: FAMILY MEDICINE

## 2024-05-28 PROCEDURE — 3080F DIAST BP >= 90 MM HG: CPT | Performed by: FAMILY MEDICINE

## 2024-05-28 PROCEDURE — 99204 OFFICE O/P NEW MOD 45 MIN: CPT | Performed by: FAMILY MEDICINE

## 2024-05-28 RX ORDER — LISINOPRIL 40 MG/1
40 TABLET ORAL DAILY
Qty: 90 TABLET | Refills: 1 | Status: SHIPPED | OUTPATIENT
Start: 2024-05-28

## 2024-05-28 RX ORDER — CARVEDILOL 6.25 MG/1
6.25 TABLET ORAL 2 TIMES DAILY
Qty: 180 TABLET | Refills: 1 | Status: SHIPPED | OUTPATIENT
Start: 2024-05-28

## 2024-05-28 RX ORDER — ATORVASTATIN CALCIUM 20 MG/1
20 TABLET, FILM COATED ORAL DAILY
Qty: 90 TABLET | Refills: 1 | Status: SHIPPED | OUTPATIENT
Start: 2024-05-28

## 2024-05-28 RX ORDER — HYDROCHLOROTHIAZIDE 25 MG/1
25 TABLET ORAL DAILY
Qty: 90 TABLET | Refills: 1 | Status: SHIPPED | OUTPATIENT
Start: 2024-05-28

## 2024-05-28 RX ORDER — AMLODIPINE BESYLATE 10 MG/1
10 TABLET ORAL DAILY
Qty: 90 TABLET | Refills: 1 | Status: SHIPPED | OUTPATIENT
Start: 2024-05-28

## 2024-05-28 ASSESSMENT — ENCOUNTER SYMPTOMS
COUGH: 0
SHORTNESS OF BREATH: 0
NAUSEA: 0
ABDOMINAL PAIN: 0
WHEEZING: 0
VOMITING: 0
DIARRHEA: 0
CONSTIPATION: 0

## 2024-05-28 NOTE — PROGRESS NOTES
Neurological:      Mental Status: She is alert.   Psychiatric:         Mood and Affect: Mood normal.            Assessment & Plan   ASSESSMENT/PLAN:  1. Primary hypertension  -     amLODIPine (NORVASC) 10 MG tablet; Take 1 tablet by mouth daily, Disp-90 tablet, R-1Normal  -     Comprehensive Metabolic Panel; Future  -     CBC with Auto Differential; Future  2. Other hyperlipidemia  -     atorvastatin (LIPITOR) 20 MG tablet; Take 1 tablet by mouth daily, Disp-90 tablet, R-1Normal  -     Lipid Panel; Future  3. Encounter for screening mammogram for malignant neoplasm of breast  -     [facility] LUCERO DIGITAL SCREEN BILATERAL; Future      Return in about 6 months (around 11/28/2024).               An electronic signature was used to authenticate this note.    --Darcy Terry, DO

## 2024-06-07 RX ORDER — CARVEDILOL 6.25 MG/1
6.25 TABLET ORAL 2 TIMES DAILY
Qty: 60 TABLET | OUTPATIENT
Start: 2024-06-07

## 2024-07-03 DIAGNOSIS — I11.9 LVH (LEFT VENTRICULAR HYPERTROPHY) DUE TO HYPERTENSIVE DISEASE, WITHOUT HEART FAILURE: ICD-10-CM

## 2024-07-03 DIAGNOSIS — I10 ESSENTIAL HYPERTENSION: ICD-10-CM

## 2024-07-03 DIAGNOSIS — E78.2 MIXED HYPERLIPIDEMIA: ICD-10-CM

## 2024-07-03 RX ORDER — ASPIRIN 81 MG/1
81 TABLET, COATED ORAL DAILY
Qty: 90 TABLET | Refills: 0 | OUTPATIENT
Start: 2024-07-03

## 2024-07-03 NOTE — TELEPHONE ENCOUNTER
Kala Gomez needs refill of   Requested Prescriptions     Pending Prescriptions Disp Refills    ASPIRIN LOW DOSE 81 MG EC tablet [Pharmacy Med Name: ASPIRIN EC 81 MG TABLET] 90 tablet 0     Sig: take 1 tablet by mouth once daily       Last Filled on:      Last Visit Date:  05-    Next Visit Date:  Visit date not found

## 2024-12-09 ENCOUNTER — OFFICE VISIT (OUTPATIENT)
Dept: FAMILY MEDICINE CLINIC | Age: 59
End: 2024-12-09
Payer: COMMERCIAL

## 2024-12-09 VITALS
BODY MASS INDEX: 45.13 KG/M2 | DIASTOLIC BLOOD PRESSURE: 98 MMHG | SYSTOLIC BLOOD PRESSURE: 162 MMHG | OXYGEN SATURATION: 97 % | HEART RATE: 92 BPM | WEIGHT: 279.6 LBS

## 2024-12-09 DIAGNOSIS — I10 PRIMARY HYPERTENSION: Primary | ICD-10-CM

## 2024-12-09 DIAGNOSIS — E78.49 OTHER HYPERLIPIDEMIA: ICD-10-CM

## 2024-12-09 PROCEDURE — 3077F SYST BP >= 140 MM HG: CPT | Performed by: FAMILY MEDICINE

## 2024-12-09 PROCEDURE — 3080F DIAST BP >= 90 MM HG: CPT | Performed by: FAMILY MEDICINE

## 2024-12-09 PROCEDURE — 99214 OFFICE O/P EST MOD 30 MIN: CPT | Performed by: FAMILY MEDICINE

## 2024-12-09 RX ORDER — CARVEDILOL 12.5 MG/1
12.5 TABLET ORAL 2 TIMES DAILY
Qty: 180 TABLET | Refills: 1 | Status: SHIPPED | OUTPATIENT
Start: 2024-12-09

## 2024-12-09 RX ORDER — AMLODIPINE BESYLATE 10 MG/1
10 TABLET ORAL DAILY
Qty: 90 TABLET | Refills: 1 | Status: SHIPPED | OUTPATIENT
Start: 2024-12-09

## 2024-12-09 RX ORDER — ATORVASTATIN CALCIUM 20 MG/1
20 TABLET, FILM COATED ORAL DAILY
Qty: 90 TABLET | Refills: 1 | Status: SHIPPED | OUTPATIENT
Start: 2024-12-09

## 2024-12-09 RX ORDER — HYDROCHLOROTHIAZIDE 25 MG/1
25 TABLET ORAL DAILY PRN
Qty: 90 TABLET | Refills: 1 | Status: SHIPPED | OUTPATIENT
Start: 2024-12-09

## 2024-12-09 RX ORDER — LISINOPRIL 40 MG/1
40 TABLET ORAL DAILY
Qty: 90 TABLET | Refills: 1 | Status: SHIPPED | OUTPATIENT
Start: 2024-12-09

## 2024-12-09 ASSESSMENT — ENCOUNTER SYMPTOMS
CONSTIPATION: 0
NAUSEA: 0
SHORTNESS OF BREATH: 0
WHEEZING: 0
DIARRHEA: 0
ABDOMINAL PAIN: 0
COUGH: 0
VOMITING: 0

## 2024-12-09 NOTE — PROGRESS NOTES
Kala Gomez (:  1965) is a 59 y.o. female,Established patient, here for evaluation of the following chief complaint(s):  Medication Refill, Blood Pressure Check, and Labs Only      Subjective   SUBJECTIVE/OBJECTIVE:  HPI  BPs have been 140s/90s but hasn't checked for weeks  Tolerating medications well  Taking medications every day except for HCTZ, holds it when she has to work or drive somewhere due to urination issues  Last Cr 0.75  Last K 4.2  Last   Last glucose 97    Review of Systems   Constitutional:  Positive for fatigue. Negative for activity change, appetite change and unexpected weight change.   Eyes:  Negative for visual disturbance.   Respiratory:  Negative for cough, shortness of breath and wheezing.    Cardiovascular:  Negative for chest pain, palpitations and leg swelling.   Gastrointestinal:  Negative for abdominal pain, constipation, diarrhea, nausea and vomiting.   Neurological:  Negative for dizziness, light-headedness and headaches.          Objective   Physical Exam  Vitals reviewed.   Constitutional:       General: She is not in acute distress.     Appearance: Normal appearance. She is obese. She is not ill-appearing.   Neck:      Vascular: No carotid bruit.   Cardiovascular:      Rate and Rhythm: Normal rate and regular rhythm.      Heart sounds: No murmur heard.     No gallop.   Pulmonary:      Effort: Pulmonary effort is normal.      Breath sounds: Normal breath sounds. No wheezing, rhonchi or rales.   Abdominal:      General: Abdomen is flat. Bowel sounds are normal. There is no distension.      Palpations: Abdomen is soft.      Tenderness: There is no abdominal tenderness. There is no guarding.   Musculoskeletal:      Right lower leg: No edema.      Left lower leg: No edema.   Skin:     General: Skin is warm.   Neurological:      Mental Status: She is alert.   Psychiatric:         Mood and Affect: Mood normal.            Assessment & Plan   ASSESSMENT/PLAN:  1. Primary

## 2025-01-07 ENCOUNTER — OFFICE VISIT (OUTPATIENT)
Dept: FAMILY MEDICINE CLINIC | Age: 60
End: 2025-01-07
Payer: COMMERCIAL

## 2025-01-07 VITALS
WEIGHT: 280.13 LBS | DIASTOLIC BLOOD PRESSURE: 98 MMHG | HEART RATE: 83 BPM | BODY MASS INDEX: 45.21 KG/M2 | OXYGEN SATURATION: 99 % | SYSTOLIC BLOOD PRESSURE: 160 MMHG | TEMPERATURE: 97.7 F

## 2025-01-07 DIAGNOSIS — I10 PRIMARY HYPERTENSION: ICD-10-CM

## 2025-01-07 PROCEDURE — 90677 PCV20 VACCINE IM: CPT | Performed by: FAMILY MEDICINE

## 2025-01-07 PROCEDURE — 90471 IMMUNIZATION ADMIN: CPT | Performed by: FAMILY MEDICINE

## 2025-01-07 PROCEDURE — 3077F SYST BP >= 140 MM HG: CPT | Performed by: FAMILY MEDICINE

## 2025-01-07 PROCEDURE — 99213 OFFICE O/P EST LOW 20 MIN: CPT | Performed by: FAMILY MEDICINE

## 2025-01-07 PROCEDURE — 3080F DIAST BP >= 90 MM HG: CPT | Performed by: FAMILY MEDICINE

## 2025-01-07 RX ORDER — CARVEDILOL 25 MG/1
25 TABLET ORAL 2 TIMES DAILY
Qty: 180 TABLET | Refills: 1 | Status: SHIPPED | OUTPATIENT
Start: 2025-01-07

## 2025-01-07 ASSESSMENT — ENCOUNTER SYMPTOMS
VOMITING: 0
COUGH: 0
ABDOMINAL PAIN: 0
DIARRHEA: 0
NAUSEA: 0
SHORTNESS OF BREATH: 0
CONSTIPATION: 0
WHEEZING: 0

## 2025-01-07 ASSESSMENT — PATIENT HEALTH QUESTIONNAIRE - PHQ9
SUM OF ALL RESPONSES TO PHQ9 QUESTIONS 1 & 2: 0
SUM OF ALL RESPONSES TO PHQ QUESTIONS 1-9: 0
1. LITTLE INTEREST OR PLEASURE IN DOING THINGS: NOT AT ALL
2. FEELING DOWN, DEPRESSED OR HOPELESS: NOT AT ALL
SUM OF ALL RESPONSES TO PHQ QUESTIONS 1-9: 0

## 2025-01-07 NOTE — PROGRESS NOTES
Kala Gomez (:  1965) is a 59 y.o. 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   Eyes:  Negative for visual disturbance.   Respiratory:  Negative for cough, shortness of breath and wheezing.    Cardiovascular:  Positive for leg swelling (occ'l, usually with increase in salt intake). Negative for chest pain and palpitations.   Gastrointestinal:  Negative for abdominal pain, constipation, diarrhea, nausea and vomiting.   Neurological:  Negative for dizziness, light-headedness and headaches.          Objective   Physical Exam  Vitals reviewed.   Constitutional:       General: She is not in acute distress.     Appearance: Normal appearance. She is obese. She is not ill-appearing.   Neck:      Vascular: No carotid bruit.   Cardiovascular:      Rate and Rhythm: Normal rate and regular rhythm.      Heart sounds: No murmur heard.     No gallop.   Pulmonary:      Effort: Pulmonary effort is normal.      Breath sounds: Normal breath sounds. No wheezing, rhonchi or rales.   Abdominal:      General: Abdomen is flat. Bowel sounds are normal. There is no distension.      Palpations: Abdomen is soft.      Tenderness: There is no abdominal tenderness. There is no guarding.   Musculoskeletal:      Right lower leg: No edema.      Left lower leg: No edema.   Skin:     General: Skin is warm.   Neurological:      Mental Status: She is alert.   Psychiatric:         Mood and Affect: Mood normal.

## 2025-05-22 ENCOUNTER — RESULTS FOLLOW-UP (OUTPATIENT)
Dept: FAMILY MEDICINE CLINIC | Age: 60
End: 2025-05-22

## 2025-05-22 DIAGNOSIS — I10 PRIMARY HYPERTENSION: ICD-10-CM

## 2025-05-22 DIAGNOSIS — E78.49 OTHER HYPERLIPIDEMIA: ICD-10-CM

## 2025-05-22 LAB
ALBUMIN: 4.3 G/DL
ALP BLD-CCNC: 85 U/L
ALT SERPL-CCNC: 28 U/L
ANION GAP SERPL CALCULATED.3IONS-SCNC: 13 MMOL/L
AST SERPL-CCNC: 29 U/L
BASOPHILS ABSOLUTE: 0.04 /ΜL
BASOPHILS RELATIVE PERCENT: 0.8 %
BILIRUB SERPL-MCNC: 0.7 MG/DL (ref 0.1–1.4)
BUN BLDV-MCNC: 23 MG/DL
CALCIUM SERPL-MCNC: 8.6 MG/DL
CHLORIDE BLD-SCNC: 101 MMOL/L
CHOLESTEROL, TOTAL: 136 MG/DL
CHOLESTEROL/HDL RATIO: 3.1
CO2: 27 MMOL/L
CREAT SERPL-MCNC: 0.81 MG/DL
EOSINOPHILS ABSOLUTE: 0.07 /ΜL
EOSINOPHILS RELATIVE PERCENT: 1.5 %
GFR, ESTIMATED: 84
GLUCOSE BLD-MCNC: 105 MG/DL
HCT VFR BLD CALC: 45 % (ref 36–46)
HDLC SERPL-MCNC: 44 MG/DL (ref 35–70)
HEMOGLOBIN: 14.4 G/DL (ref 12–16)
LDL CHOLESTEROL: 76
LYMPHOCYTES ABSOLUTE: 1.34 /ΜL
LYMPHOCYTES RELATIVE PERCENT: 28.2 %
MCH RBC QN AUTO: 29.5 PG
MCHC RBC AUTO-ENTMCNC: 32 G/DL
MCV RBC AUTO: 92 FL
MONOCYTES ABSOLUTE: 0.56 /ΜL
MONOCYTES RELATIVE PERCENT: 11.8 %
NEUTROPHILS ABSOLUTE: 2.74 /ΜL
NEUTROPHILS RELATIVE PERCENT: 57.5 %
NONHDLC SERPL-MCNC: NORMAL MG/DL
PDW BLD-RTO: 13.3 %
PLATELET # BLD: 255 K/ΜL
PMV BLD AUTO: NORMAL FL
POTASSIUM SERPL-SCNC: 4.2 MMOL/L
RBC # BLD: 4.88 10^6/ΜL
SODIUM BLD-SCNC: 141 MMOL/L
TOTAL PROTEIN: 6.2 G/DL (ref 6.4–8.2)
TRIGL SERPL-MCNC: 80 MG/DL
VLDLC SERPL CALC-MCNC: 16 MG/DL
WBC # BLD: 4.8 10^3/ML

## 2025-05-22 NOTE — TELEPHONE ENCOUNTER
I called the patient and received voicemail. I left a message to give the office a call back and the office phone number was given.

## 2025-05-23 NOTE — TELEPHONE ENCOUNTER
I called the patient and received voicemail. I left a message for her to give the office a call back and left the office phone number.

## 2025-06-03 ASSESSMENT — ENCOUNTER SYMPTOMS
ABDOMINAL PAIN: 0
CONSTIPATION: 0
VOMITING: 0
SHORTNESS OF BREATH: 0
DIARRHEA: 0
NAUSEA: 0

## 2025-06-03 NOTE — PROGRESS NOTES
Kala Gomez (:  1965) is a 59 y.o. female,Established patient, here for evaluation of the following chief complaint(s):  Discuss Labs      Subjective   SUBJECTIVE/OBJECTIVE:  HPI  BPs have been better but still high  Tolerating medications well except for increased fatigue since starting the higher dose of carvedilol  Rarely takes her water pill, did take yesterday and blood pressures are better with decrease in swelling  Taking medications every day yes, compliant  Last Cr 0.81  Last K 4.2  Last LDL 76 (was 134)  Last glucose 105  Additional concerns allergies are flared up with mowing, has done well with steroid shots in the past    Review of Systems   Constitutional:  Positive for fatigue. Negative for activity change, appetite change and unexpected weight change.   HENT:  Positive for congestion, postnasal drip, rhinorrhea and sore throat (scratchy). Negative for sinus pressure.    Eyes:  Negative for itching.   Respiratory:  Negative for cough, shortness of breath and wheezing.    Cardiovascular:  Positive for leg swelling. Negative for chest pain and palpitations.   Gastrointestinal:  Negative for abdominal pain, constipation, diarrhea, nausea and vomiting.   Neurological:  Positive for headaches (mild sinus pressure). Negative for dizziness and light-headedness.          Objective   Physical Exam  Vitals reviewed.   Constitutional:       General: She is not in acute distress.     Appearance: Normal appearance. She is obese. She is not ill-appearing.   HENT:      Right Ear: A middle ear effusion is present.      Left Ear: A middle ear effusion is present.      Nose: Congestion and rhinorrhea present. Rhinorrhea is clear.      Right Turbinates: Pale.      Left Turbinates: Pale.      Right Sinus: No maxillary sinus tenderness or frontal sinus tenderness.      Left Sinus: No maxillary sinus tenderness or frontal sinus tenderness.      Mouth/Throat:      Pharynx: Posterior oropharyngeal erythema and

## 2025-06-04 ENCOUNTER — OFFICE VISIT (OUTPATIENT)
Dept: FAMILY MEDICINE CLINIC | Age: 60
End: 2025-06-04
Payer: COMMERCIAL

## 2025-06-04 VITALS
DIASTOLIC BLOOD PRESSURE: 96 MMHG | HEART RATE: 101 BPM | RESPIRATION RATE: 18 BRPM | OXYGEN SATURATION: 94 % | BODY MASS INDEX: 44.42 KG/M2 | WEIGHT: 276.4 LBS | HEIGHT: 66 IN | TEMPERATURE: 98.1 F | SYSTOLIC BLOOD PRESSURE: 148 MMHG

## 2025-06-04 DIAGNOSIS — E78.49 OTHER HYPERLIPIDEMIA: ICD-10-CM

## 2025-06-04 DIAGNOSIS — I10 PRIMARY HYPERTENSION: Primary | ICD-10-CM

## 2025-06-04 DIAGNOSIS — I10 PRIMARY HYPERTENSION: ICD-10-CM

## 2025-06-04 DIAGNOSIS — J30.1 SEASONAL ALLERGIC RHINITIS DUE TO POLLEN: ICD-10-CM

## 2025-06-04 PROCEDURE — 99214 OFFICE O/P EST MOD 30 MIN: CPT | Performed by: FAMILY MEDICINE

## 2025-06-04 PROCEDURE — 3077F SYST BP >= 140 MM HG: CPT | Performed by: FAMILY MEDICINE

## 2025-06-04 PROCEDURE — 3080F DIAST BP >= 90 MM HG: CPT | Performed by: FAMILY MEDICINE

## 2025-06-04 RX ORDER — METHYLPREDNISOLONE ACETATE 80 MG/ML
80 INJECTION, SUSPENSION INTRA-ARTICULAR; INTRALESIONAL; INTRAMUSCULAR; SOFT TISSUE ONCE
Status: COMPLETED | OUTPATIENT
Start: 2025-06-04 | End: 2025-06-04

## 2025-06-04 RX ORDER — LISINOPRIL 40 MG/1
40 TABLET ORAL DAILY
Qty: 90 TABLET | Refills: 1 | OUTPATIENT
Start: 2025-06-04

## 2025-06-04 RX ORDER — ATORVASTATIN CALCIUM 20 MG/1
20 TABLET, FILM COATED ORAL DAILY
Qty: 90 TABLET | Refills: 1 | OUTPATIENT
Start: 2025-06-04

## 2025-06-04 RX ORDER — HYDROCHLOROTHIAZIDE 25 MG/1
25 TABLET ORAL DAILY PRN
Qty: 90 TABLET | Refills: 1 | OUTPATIENT
Start: 2025-06-04

## 2025-06-04 RX ORDER — HYDROCHLOROTHIAZIDE 25 MG/1
25 TABLET ORAL DAILY
Qty: 90 TABLET | Refills: 1 | Status: SHIPPED | OUTPATIENT
Start: 2025-06-04

## 2025-06-04 RX ORDER — AMLODIPINE BESYLATE 10 MG/1
10 TABLET ORAL DAILY
Qty: 90 TABLET | Refills: 1 | Status: SHIPPED | OUTPATIENT
Start: 2025-06-04

## 2025-06-04 RX ORDER — LISINOPRIL 40 MG/1
40 TABLET ORAL DAILY
Qty: 90 TABLET | Refills: 1 | Status: SHIPPED | OUTPATIENT
Start: 2025-06-04

## 2025-06-04 RX ORDER — CARVEDILOL 12.5 MG/1
12.5 TABLET ORAL 2 TIMES DAILY
Qty: 180 TABLET | Refills: 1 | Status: SHIPPED | OUTPATIENT
Start: 2025-06-04

## 2025-06-04 RX ORDER — AMLODIPINE BESYLATE 10 MG/1
10 TABLET ORAL DAILY
Qty: 90 TABLET | Refills: 1 | OUTPATIENT
Start: 2025-06-04

## 2025-06-04 RX ORDER — ATORVASTATIN CALCIUM 20 MG/1
20 TABLET, FILM COATED ORAL DAILY
Qty: 90 TABLET | Refills: 1 | Status: SHIPPED | OUTPATIENT
Start: 2025-06-04

## 2025-06-04 RX ADMIN — METHYLPREDNISOLONE ACETATE 80 MG: 80 INJECTION, SUSPENSION INTRA-ARTICULAR; INTRALESIONAL; INTRAMUSCULAR; SOFT TISSUE at 09:50

## 2025-06-04 SDOH — ECONOMIC STABILITY: FOOD INSECURITY: WITHIN THE PAST 12 MONTHS, YOU WORRIED THAT YOUR FOOD WOULD RUN OUT BEFORE YOU GOT MONEY TO BUY MORE.: NEVER TRUE

## 2025-06-04 SDOH — ECONOMIC STABILITY: FOOD INSECURITY: WITHIN THE PAST 12 MONTHS, THE FOOD YOU BOUGHT JUST DIDN'T LAST AND YOU DIDN'T HAVE MONEY TO GET MORE.: NEVER TRUE

## 2025-06-04 ASSESSMENT — ENCOUNTER SYMPTOMS
RHINORRHEA: 1
SORE THROAT: 1
SINUS PRESSURE: 0
EYE ITCHING: 0
COUGH: 0
WHEEZING: 0

## 2025-06-04 NOTE — PROGRESS NOTES
Administrations This Visit       methylPREDNISolone acetate (DEPO-MEDROL) injection 80 mg       Admin Date  06/04/2025 Action  Given Dose  80 mg Route  [facility] Documented By  Shira Mueller LPN

## 2025-06-04 NOTE — TELEPHONE ENCOUNTER
Kala Gomez needs refill of   Requested Prescriptions     Pending Prescriptions Disp Refills    hydroCHLOROthiazide (HYDRODIURIL) 25 MG tablet [Pharmacy Med Name: HYDROCHLOROTHIAZIDE 25 MG TAB] 90 tablet 1     Sig: TAKE 1 TABLET BY MOUTH DAILY AS NEEDED (SWELLING).    lisinopril (PRINIVIL;ZESTRIL) 40 MG tablet [Pharmacy Med Name: LISINOPRIL 40 MG TABLET] 90 tablet 1     Sig: TAKE 1 TABLET BY MOUTH EVERY DAY    atorvastatin (LIPITOR) 20 MG tablet [Pharmacy Med Name: ATORVASTATIN 20 MG TABLET] 90 tablet 1     Sig: TAKE 1 TABLET BY MOUTH EVERY DAY    amLODIPine (NORVASC) 10 MG tablet [Pharmacy Med Name: AMLODIPINE BESYLATE 10 MG TAB] 90 tablet 1     Sig: TAKE 1 TABLET BY MOUTH EVERY DAY       Last Filled on:  12/9/2024    Last Visit Date:  1/7/2025    Next Visit Date:  6/4/2025